# Patient Record
Sex: FEMALE | Race: BLACK OR AFRICAN AMERICAN | NOT HISPANIC OR LATINO
[De-identification: names, ages, dates, MRNs, and addresses within clinical notes are randomized per-mention and may not be internally consistent; named-entity substitution may affect disease eponyms.]

---
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
## 2024-07-11 NOTE — ED PEDIATRIC NURSE NOTE - RESPIRATION RHYTHM, QM
regular No cogwheeling or rigidity, no tremors  Tone: cogwheel rigidity [ ], hypertonus [ ], atonia [ ], paratonia: gegenhalten [ ] mitgehen [ ]  Movement: tremor - static [ ] intentional [ ], dysmetria [ ], dysdiadochokinesia [ ]  Reflexes: hyperreflexia [ ], myoclonus [ ], hyporeflexia [ ], primitive reflexes [ ]  Speech: aphasia: motor (expressive)  [ ] sensory (receptive) [ ] conduction aphasia [ ] global aphasia [ ]